# Patient Record
Sex: FEMALE | Race: WHITE | NOT HISPANIC OR LATINO | URBAN - NONMETROPOLITAN AREA
[De-identification: names, ages, dates, MRNs, and addresses within clinical notes are randomized per-mention and may not be internally consistent; named-entity substitution may affect disease eponyms.]

---

## 2020-12-09 ENCOUNTER — IMPORTED ENCOUNTER (OUTPATIENT)
Dept: URBAN - NONMETROPOLITAN AREA CLINIC 1 | Facility: CLINIC | Age: 75
End: 2020-12-09

## 2020-12-09 PROBLEM — L03.213: Noted: 2020-12-09

## 2020-12-09 PROCEDURE — 92002 INTRM OPH EXAM NEW PATIENT: CPT

## 2021-05-04 ENCOUNTER — IMPORTED ENCOUNTER (OUTPATIENT)
Dept: URBAN - NONMETROPOLITAN AREA CLINIC 1 | Facility: CLINIC | Age: 76
End: 2021-05-04

## 2021-05-04 PROBLEM — H00.14: Noted: 2021-05-04

## 2021-05-04 PROCEDURE — 92012 INTRM OPH EXAM EST PATIENT: CPT

## 2021-05-04 NOTE — PATIENT DISCUSSION
majo horton w/pt instilled one drop Lotemax OS in office pt using cipro prn possible allergic reaction recommend pt do warm compresses to alleviate swelling

## 2021-09-28 ENCOUNTER — IMPORTED ENCOUNTER (OUTPATIENT)
Dept: URBAN - NONMETROPOLITAN AREA CLINIC 1 | Facility: CLINIC | Age: 76
End: 2021-09-28

## 2021-09-28 PROBLEM — H26.493: Noted: 2021-09-28

## 2021-09-28 PROBLEM — H43.22: Noted: 2021-09-28

## 2021-09-28 PROBLEM — H52.4: Noted: 2021-09-28

## 2021-09-28 PROBLEM — Z96.1: Noted: 2021-09-28

## 2021-09-28 PROBLEM — H43.813: Noted: 2021-09-28

## 2021-09-28 PROBLEM — H40.053: Noted: 2021-09-28

## 2021-09-28 PROCEDURE — 92133 CPTRZD OPH DX IMG PST SGM ON: CPT

## 2021-09-28 PROCEDURE — 92014 COMPRE OPH EXAM EST PT 1/>: CPT

## 2021-09-28 PROCEDURE — 92015 DETERMINE REFRACTIVE STATE: CPT

## 2021-09-28 PROCEDURE — 76514 ECHO EXAM OF EYE THICKNESS: CPT

## 2021-09-28 NOTE — PATIENT DISCUSSION
PVD OU Asteroid Hyalosis OS-Retina flat 360 with no breaks tears or heme.-S&S of RD/RT reviewed with pt.-Stressed that pt should contact our office right away with any changes or increase in symptoms. Glaucoma Suspect-Based on IOP age-Appears stable at this time.-Continue to monitor with exams and testing.-Order pachymetry and ON OCT today and reviewed repeat annuallyPC IOL PCO [id number]-Nu issued

## 2022-04-09 ASSESSMENT — VISUAL ACUITY
OS_SC: 20/20
OD_SC: 20/25
OS_SC: 20/25-1
OS_CC: 20/20
OD_CC: 20/20
OD_SC: 20/25-1

## 2022-04-09 ASSESSMENT — TONOMETRY
OS_IOP_MMHG: 22
OD_IOP_MMHG: 22